# Patient Record
(demographics unavailable — no encounter records)

---

## 2018-12-28 NOTE — PHYS DOC
Past Medical History


Past Medical History:  No Pertinent History


Past Surgical History:  No Surgical History


Alcohol Use:  None


Drug Use:  None





Adult General


Chief Complaint


Chief Complaint:  GENERALIZED BODY ACHES





HPI


HPI





Patient is a 46  year old male who presents with the last couple weeks she's 

had a headache, runny nose, cough and then yesterday began having lower 

abdominal cramping with diarrhea. He states he did try to take Imodium, 

ibuprofen and TheraFlu. He is afebrile. He states he was having cold and hot 

chills. Denies dysuria.





Review of Systems


Review of Systems





Constitutional: Denies fever or chills []


Eyes: Denies change in visual acuity, redness, or eye pain []


HENT: Denies nasal congestion or sore throat []


Respiratory: Denies cough or shortness of breath []


Cardiovascular: No additional information not addressed in HPI []


GI: Denies abdominal pain, nausea, vomiting, bloody stools or diarrhea []


: Denies dysuria or hematuria []


Musculoskeletal: Denies back pain or joint pain []


Integument: Denies rash or skin lesions []


Neurologic: Denies headache, focal weakness or sensory changes []


Endocrine: Denies polyuria or polydipsia []





All other systems were reviewed and found to be within normal limits, except as 

documented in this note.





Current Medications


Current Medications





Current Medications








 Medications


  (Trade)  Dose


 Ordered  Sig/Alton  Start Time


 Stop Time Status Last Admin


Dose Admin


 


 Dicyclomine HCl


  (Bentyl)  10 mg  1X  ONCE  12/28/18 15:45


 12/28/18 15:46  12/28/18 15:34


10 MG











Allergies


Allergies





Allergies








Coded Allergies Type Severity Reaction Last Updated Verified


 


  No Known Drug Allergies    12/28/18 No











Physical Exam


Physical Exam





Constitutional: Well developed, well nourished, no acute distress, non-toxic 

appearance. []


HENT: Normocephalic, atraumatic, bilateral external ears normal, oropharynx 

moist, no oral exudates, nose normal. []


Eyes: PERRLA, EOMI, conjunctiva normal, no discharge. [] 


Neck: Normal range of motion, no tenderness, supple, no stridor. [] 


Cardiovascular:Heart rate regular rhythm, no murmur []


Lungs & Thorax:  Bilateral breath sounds clear to auscultation []


Abdomen: Bowel sounds normal, soft, no tenderness, no masses, no pulsatile 

masses. [] 


Skin: Warm, dry, no erythema, no rash. [] 


Back: No tenderness, no CVA tenderness. [] 


Extremities: No tenderness, no cyanosis, no clubbing, ROM intact, no edema. [] 


Neurologic: Alert and oriented X 3, normal motor function, normal sensory 

function, no focal deficits noted. []


Psychologic: Affect normal, judgement normal, mood normal. []





Current Patient Data


Vital Signs





 Vital Signs








  Date Time  Temp Pulse Resp B/P (MAP) Pulse Ox O2 Delivery O2 Flow Rate FiO2


 


12/28/18 14:30 98.1 103 20 128/79 (95) 98 Room Air  





 98.1       








Lab Values





 Laboratory Tests








Test


 12/28/18


14:56


 


Influenza Type A Antigen


 Negative


(NEGATIVE)


 


Influenza Type B Antigen


 Negative


(NEGATIVE)











EKG


EKG


[]





Radiology/Procedures


Radiology/Procedures


[]





Course & Med Decision Making


Course & Med Decision Making


Patient is a 46  year old male who presents with the last couple weeks he's had 

a headache, runny nose, cough and then yesterday began having lower abdominal 

cramping with diarrhea. He states he did try to take Imodium, ibuprofen and 

TheraFlu. He is afebrile. He states he was having cold and hot chills. Denies 

dysuria. Alert and oriented. Skin pink warm and dry. Speaks in full clear 

sentences. Mucus membranes are moist. Abdomen is soft and nontender. Lungs are 

clear to auscultation lobes. Heart regular without murmur. Afebrile. Throat is 

red but there are no exudates. Bilateral ear. Tympanic are pearly white. 

Patient is given a shot of Bentyl in the ED. Patient is told to continue 

pushing fluids and taking over-the-counter medications. Patient likely has a 

viral gastritis.  Patient denies dizziness, shortness of air, chest pain, 

nausea or vomiting.





Dragon Disclaimer


Dragon Disclaimer


This electronic medical record was generated, in whole or in part, using a 

voice recognition dictation system.





Departure


Departure


Impression:  


 Primary Impression:  


 Viral gastritis


 Additional Impressions:  


 Diarrhea


 Upper respiratory infection


Disposition:  01 HOME, SELF-CARE


Condition:  STABLE


Referrals:  


NO PCP (PCP)


Patient Instructions:  Diarrhea, Gastritis, Adult





Additional Instructions:  


Follow-up with her primary care sooner as possible. Continue taking over-the-

counter medications. Drink plenty of fluids.


Scripts


Azithromycin (AZITHROMYCIN TABLET) 250 Mg Tablet


1 PKG PO UD, #6 TAB


   Prov: ETHEL MACKENZIE         12/28/18





Problem Qualifiers








 Additional Impressions:  


 Diarrhea


 Diarrhea type:  unspecified type  Qualified Codes:  R19.7 - Diarrhea, 

unspecified


 Upper respiratory infection


 URI type:  unspecified URI  Qualified Codes:  J06.9 - Acute upper respiratory 

infection, unspecified








ETHEL MACKENZIE Dec 28, 2018 15:33